# Patient Record
Sex: FEMALE | Race: WHITE | NOT HISPANIC OR LATINO | Employment: UNEMPLOYED | URBAN - METROPOLITAN AREA
[De-identification: names, ages, dates, MRNs, and addresses within clinical notes are randomized per-mention and may not be internally consistent; named-entity substitution may affect disease eponyms.]

---

## 2023-08-20 ENCOUNTER — HOSPITAL ENCOUNTER (EMERGENCY)
Facility: HOSPITAL | Age: 33
Discharge: HOME | End: 2023-08-21
Attending: EMERGENCY MEDICINE | Admitting: EMERGENCY MEDICINE

## 2023-08-20 DIAGNOSIS — F41.0 PANIC ATTACK: ICD-10-CM

## 2023-08-20 DIAGNOSIS — S92.911A CLOSED NONDISPLACED FRACTURE OF PHALANX OF TOE OF RIGHT FOOT, UNSPECIFIED TOE, INITIAL ENCOUNTER: ICD-10-CM

## 2023-08-20 DIAGNOSIS — R07.89 ATYPICAL CHEST PAIN: ICD-10-CM

## 2023-08-20 LAB
BASOPHILS # BLD AUTO: 0.1 10E3/UL (ref 0–0.2)
BASOPHILS NFR BLD AUTO: 1 %
EOSINOPHIL # BLD AUTO: 0 10E3/UL (ref 0–0.7)
EOSINOPHIL NFR BLD AUTO: 0 %
ERYTHROCYTE [DISTWIDTH] IN BLOOD BY AUTOMATED COUNT: 12.6 % (ref 10–15)
HCT VFR BLD AUTO: 32.7 % (ref 35–47)
HGB BLD-MCNC: 11.1 G/DL (ref 11.7–15.7)
IMM GRANULOCYTES # BLD: 0 10E3/UL
IMM GRANULOCYTES NFR BLD: 0 %
LYMPHOCYTES # BLD AUTO: 1.5 10E3/UL (ref 0.8–5.3)
LYMPHOCYTES NFR BLD AUTO: 21 %
MCH RBC QN AUTO: 29.8 PG (ref 26.5–33)
MCHC RBC AUTO-ENTMCNC: 33.9 G/DL (ref 31.5–36.5)
MCV RBC AUTO: 88 FL (ref 78–100)
MONOCYTES # BLD AUTO: 0.5 10E3/UL (ref 0–1.3)
MONOCYTES NFR BLD AUTO: 7 %
NEUTROPHILS # BLD AUTO: 4.8 10E3/UL (ref 1.6–8.3)
NEUTROPHILS NFR BLD AUTO: 71 %
NRBC # BLD AUTO: 0 10E3/UL
NRBC BLD AUTO-RTO: 0 /100
PLATELET # BLD AUTO: 185 10E3/UL (ref 150–450)
RBC # BLD AUTO: 3.73 10E6/UL (ref 3.8–5.2)
WBC # BLD AUTO: 6.9 10E3/UL (ref 4–11)

## 2023-08-20 PROCEDURE — 93005 ELECTROCARDIOGRAM TRACING: CPT | Performed by: EMERGENCY MEDICINE

## 2023-08-20 PROCEDURE — 80048 BASIC METABOLIC PNL TOTAL CA: CPT | Performed by: EMERGENCY MEDICINE

## 2023-08-20 PROCEDURE — 84439 ASSAY OF FREE THYROXINE: CPT | Performed by: EMERGENCY MEDICINE

## 2023-08-20 PROCEDURE — 250N000011 HC RX IP 250 OP 636: Performed by: EMERGENCY MEDICINE

## 2023-08-20 PROCEDURE — 36415 COLL VENOUS BLD VENIPUNCTURE: CPT | Performed by: EMERGENCY MEDICINE

## 2023-08-20 PROCEDURE — 28510 TREATMENT OF TOE FRACTURE: CPT | Mod: T8

## 2023-08-20 PROCEDURE — 99285 EMERGENCY DEPT VISIT HI MDM: CPT | Mod: 25

## 2023-08-20 PROCEDURE — 84443 ASSAY THYROID STIM HORMONE: CPT | Performed by: EMERGENCY MEDICINE

## 2023-08-20 PROCEDURE — 84484 ASSAY OF TROPONIN QUANT: CPT | Performed by: EMERGENCY MEDICINE

## 2023-08-20 PROCEDURE — 250N000013 HC RX MED GY IP 250 OP 250 PS 637: Performed by: EMERGENCY MEDICINE

## 2023-08-20 PROCEDURE — 85025 COMPLETE CBC W/AUTO DIFF WBC: CPT | Performed by: EMERGENCY MEDICINE

## 2023-08-20 PROCEDURE — 96374 THER/PROPH/DIAG INJ IV PUSH: CPT | Mod: 59

## 2023-08-20 RX ORDER — ASPIRIN 81 MG/1
324 TABLET, CHEWABLE ORAL ONCE
Status: COMPLETED | OUTPATIENT
Start: 2023-08-20 | End: 2023-08-20

## 2023-08-20 RX ORDER — KETOROLAC TROMETHAMINE 30 MG/ML
30 INJECTION, SOLUTION INTRAMUSCULAR; INTRAVENOUS ONCE
Status: COMPLETED | OUTPATIENT
Start: 2023-08-20 | End: 2023-08-20

## 2023-08-20 RX ADMIN — KETOROLAC TROMETHAMINE 30 MG: 30 INJECTION INTRAMUSCULAR; INTRAVENOUS at 23:44

## 2023-08-20 RX ADMIN — ASPIRIN 81 MG CHEWABLE TABLET 324 MG: 81 TABLET CHEWABLE at 23:43

## 2023-08-20 ASSESSMENT — ACTIVITIES OF DAILY LIVING (ADL): ADLS_ACUITY_SCORE: 33

## 2023-08-21 ENCOUNTER — APPOINTMENT (OUTPATIENT)
Dept: RADIOLOGY | Facility: HOSPITAL | Age: 33
End: 2023-08-21
Attending: EMERGENCY MEDICINE

## 2023-08-21 VITALS
RESPIRATION RATE: 26 BRPM | OXYGEN SATURATION: 99 % | HEART RATE: 76 BPM | DIASTOLIC BLOOD PRESSURE: 67 MMHG | TEMPERATURE: 98.1 F | SYSTOLIC BLOOD PRESSURE: 114 MMHG

## 2023-08-21 LAB
ANION GAP SERPL CALCULATED.3IONS-SCNC: 13 MMOL/L (ref 7–15)
ATRIAL RATE - MUSE: 77 BPM
BUN SERPL-MCNC: 10.7 MG/DL (ref 6–20)
CALCIUM SERPL-MCNC: 9.2 MG/DL (ref 8.6–10)
CHLORIDE SERPL-SCNC: 104 MMOL/L (ref 98–107)
CREAT SERPL-MCNC: 0.85 MG/DL (ref 0.51–0.95)
DEPRECATED HCO3 PLAS-SCNC: 23 MMOL/L (ref 22–29)
DIASTOLIC BLOOD PRESSURE - MUSE: 78 MMHG
GFR SERPL CREATININE-BSD FRML MDRD: >90 ML/MIN/1.73M2
GLUCOSE SERPL-MCNC: 121 MG/DL (ref 70–99)
INTERPRETATION ECG - MUSE: NORMAL
P AXIS - MUSE: 76 DEGREES
POTASSIUM SERPL-SCNC: 3.5 MMOL/L (ref 3.4–5.3)
PR INTERVAL - MUSE: 160 MS
QRS DURATION - MUSE: 90 MS
QT - MUSE: 412 MS
QTC - MUSE: 466 MS
R AXIS - MUSE: 69 DEGREES
SODIUM SERPL-SCNC: 140 MMOL/L (ref 136–145)
SYSTOLIC BLOOD PRESSURE - MUSE: 127 MMHG
T AXIS - MUSE: 51 DEGREES
T4 FREE SERPL-MCNC: 1.2 NG/DL (ref 0.9–1.7)
TROPONIN T SERPL HS-MCNC: <6 NG/L
TSH SERPL DL<=0.005 MIU/L-ACNC: 10.27 UIU/ML (ref 0.3–4.2)
VENTRICULAR RATE- MUSE: 77 BPM

## 2023-08-21 PROCEDURE — 73630 X-RAY EXAM OF FOOT: CPT | Mod: RT

## 2023-08-21 ASSESSMENT — ACTIVITIES OF DAILY LIVING (ADL): ADLS_ACUITY_SCORE: 35

## 2023-08-21 NOTE — DISCHARGE INSTRUCTIONS
EKG and blood work for the heart today is normal.  Your TSH is elevated, but T4 is normal.  The x-ray does show a broken toe.  You can buddy tape this as needed for comfort.

## 2023-08-21 NOTE — ED PROVIDER NOTES
EMERGENCY DEPARTMENT ENCOUNTER      NAME: Puma Briones  AGE: 33 year old female  YOB: 1990  MRN: [id number]  EVALUATION DATE & TIME: 8/20/2023 10:55 PM    PCP: No primary care provider on file.    ED PROVIDER: Maryam Kyle MD    Chief Complaint   Patient presents with    Chest Pain    Anxiety         FINAL IMPRESSION:  1. Panic attack    2. Atypical chest pain    3. Closed nondisplaced fracture of phalanx of toe of right foot, unspecified toe, initial encounter          ED COURSE & MEDICAL DECISION MAKING:    Pertinent Labs & Imaging studies reviewed. (See chart for details)  33 year old female with history of hypothyroidism who presents to the Emergency Department for evaluation of right-sided fourth toe pain.  On examination this is bruised, tender concerning for fracture versus sprain versus contusion.  X-ray obtained, fracture of the fourth distal phalanx.  This was alexander taped.  Patient counseled regarding same.      The main reason for her ED visit however is the episode of hyperventilation, chest discomfort, shortness of breath, lightheadedness, palpitations and finger paresthesias that followed a ice bath submersion of her foot.  She has had similar episodes in the past, but typically they are self-limited.  This seems most consistent with anxiety/panic attack.  Patient is PERC negative and does not warrant work-up for pulmonary embolism.  Certainly arrhythmia is on the differential, but I think less likely given the constellation of symptoms.  My concern for ACS is low.  She denies any significant caffeine, stimulant abuse.    Patient placed on monitor, IV established and blood obtained.  Twelve-lead EKG shows sinus rhythm.  Patient was given a dose of aspirin, Toradol for pain.  CBC, BMP, troponin notable for anemia with hemoglobin of 11.1.  No previous with which to compare.  TSH is elevated at 10.27 but free T4 is normal at 1.2 consistent with her history.  Patient was reassured,  counseled.  Safe for discharge to home.  Given PCP referral to establish care.      ED Course as of 08/21/23 0303   Sun Aug 20, 2023   2311 I met with the patient to gather history and perform my exam. ED course and treatment discussed.    2348 Hemoglobin(!): 11.1  No prev   Mon Aug 21, 2023   0032 TSH(!): 10.27  Hx of hypothyroid per pt   0032 Troponin T, High Sensitivity: <6  Doesn't warrant repeat    0045 Foot  XR, G/E 3 views, right  X-ray of the foot independently interpreted by myself with visualized fracture of the 4th distal phalanx   0130 I updated the patient with lab and imaging results and discussed the plan for discharge.          Medical Decision Making    History:  Supplemental history from: Documented in chart, if applicable and Friend  External Record(s) reviewed: Documented in chart, if applicable.    Work Up:  Chart documentation includes differential considered and any EKGs or imaging independently interpreted by provider, see MDM  In additional to work up documented, I considered the following work up: see MDM    External consultation:  Discussion of management with another provider: na    Complicating factors:  Care impacted by chronic illness: Other: hypothryroidism   Care affected by social determinants of health: Access to Medical Care no PCP    Disposition considerations: Discharge. No recommendations on prescription strength medication(s). See documentation for any additional details.        At the conclusion of the encounter I discussed the results of all of the tests and the disposition. The questions were answered. The patient or family acknowledged understanding and was agreeable with the care plan.    MEDICATIONS GIVEN IN THE EMERGENCY:  Medications   aspirin (ASA) chewable tablet 324 mg (324 mg Oral $Given 8/20/23 2343)   ketorolac (TORADOL) injection 30 mg (30 mg Intravenous $Given 8/20/23 2344)       NEW PRESCRIPTIONS STARTED AT TODAY'S ER VISIT  There are no discharge medications  "for this patient.         =================================================================    HPI    Patient information was obtained from: Patient     Use of Intrepreter: N/A      Puma Briones is a 33 year old female with pertinent history of hypothyroidism (self reported) who presents with multiple complaints    The patient reports yesterday she \"bumped\" her right 4th toe on a chair and it is now bruised and painful. Tonight, the patient put her right foot into a bucket of cold water.  Thereafter \"suddenly\" felt numbness in her hands, dizziness, shortness of breath, chest pain, \"racing\" heart, and lightheadedness. The patient denies loss of consciousness, but notes it felt like she was going to faint. The patient took a warm shower to calm down, but states she \"couldn't stop shaking\" and \"couldn't stop breathing hard\". The patient denies increased caffeine use, nausea, vomiting, diarrhea, leg swelling, recent travel, hx of dvt/pe.    The patient notes for the past year she has had similar events will usually resolve on their own, but tonight it didn't so the patient came to the ED.    PAST MEDICAL HISTORY:  No past medical history on file.    PAST SURGICAL HISTORY:  No past surgical history on file.    CURRENT MEDICATIONS:    None       ALLERGIES:  Not on File    FAMILY HISTORY:  No family history on file.    SOCIAL HISTORY:        VITALS:  Patient Vitals for the past 24 hrs:   BP Temp Temp src Pulse Resp SpO2   08/21/23 0119 114/67 -- -- 76 26 99 %   08/21/23 0059 -- -- -- 76 29 99 %   08/21/23 0031 -- -- -- 78 15 100 %   08/20/23 2332 -- -- -- 80 14 99 %   08/20/23 2321 132/81 -- -- 76 -- 100 %   08/20/23 2315 124/62 -- -- 86 28 98 %   08/20/23 2259 127/78 -- -- 84 -- 99 %   08/20/23 2250 (!) 140/75 98.1  F (36.7  C) Oral 90 16 100 %       PHYSICAL EXAM    General Appearance: Anxious-appearing, well-nourished, no acute distress   Head:  Normocephalic  Chest:  No tenderness or deformity, no crepitus  Cardio: "  Regular rate and rhythm, no murmur/gallop/rub, 2+ pulses symmetric in all extremities  Pulm:  No respiratory distress, clear to auscultation bilaterally  Back:  No CVA tenderness, normal ROM  Abdomen:  Soft, non-tender  Extremities:  Extremities normal, no cyanosis or edema, full ROM and motor tone intact, bilateral pulses intact upper and lower. Right 4th toe is bruised and swollen and TTP  Skin:  Skin warm, dry, no rashes  Neuro:  Alert and oriented ×3, moving all extremities, no gross sensory defects     RADIOLOGY/LABS:  Reviewed all pertinent imaging. Please see official radiology report. All pertinent labs reviewed and interpreted.    Results for orders placed or performed during the hospital encounter of 08/20/23   Foot  XR, G/E 3 views, right    Impression    IMPRESSION: The right 2 phalanges in the fourth and fifth digits, normal variant anatomy. Subtle lucency involving the proximal metaphysis of the distal phalanx raises the possibility of an undisplaced fracture. Please correlate clinically for focal   tenderness at this level. No extension to the articular surface. Bipartite medial sesamoid at the head of the first metatarsal.   Basic metabolic panel   Result Value Ref Range    Sodium 140 136 - 145 mmol/L    Potassium 3.5 3.4 - 5.3 mmol/L    Chloride 104 98 - 107 mmol/L    Carbon Dioxide (CO2) 23 22 - 29 mmol/L    Anion Gap 13 7 - 15 mmol/L    Urea Nitrogen 10.7 6.0 - 20.0 mg/dL    Creatinine 0.85 0.51 - 0.95 mg/dL    Calcium 9.2 8.6 - 10.0 mg/dL    Glucose 121 (H) 70 - 99 mg/dL    GFR Estimate >90 >60 mL/min/1.73m2   Result Value Ref Range    Troponin T, High Sensitivity <6 <=14 ng/L   TSH with free T4 reflex   Result Value Ref Range    TSH 10.27 (H) 0.30 - 4.20 uIU/mL   CBC with platelets and differential   Result Value Ref Range    WBC Count 6.9 4.0 - 11.0 10e3/uL    RBC Count 3.73 (L) 3.80 - 5.20 10e6/uL    Hemoglobin 11.1 (L) 11.7 - 15.7 g/dL    Hematocrit 32.7 (L) 35.0 - 47.0 %    MCV 88 78 - 100  fL    MCH 29.8 26.5 - 33.0 pg    MCHC 33.9 31.5 - 36.5 g/dL    RDW 12.6 10.0 - 15.0 %    Platelet Count 185 150 - 450 10e3/uL    % Neutrophils 71 %    % Lymphocytes 21 %    % Monocytes 7 %    % Eosinophils 0 %    % Basophils 1 %    % Immature Granulocytes 0 %    NRBCs per 100 WBC 0 <1 /100    Absolute Neutrophils 4.8 1.6 - 8.3 10e3/uL    Absolute Lymphocytes 1.5 0.8 - 5.3 10e3/uL    Absolute Monocytes 0.5 0.0 - 1.3 10e3/uL    Absolute Eosinophils 0.0 0.0 - 0.7 10e3/uL    Absolute Basophils 0.1 0.0 - 0.2 10e3/uL    Absolute Immature Granulocytes 0.0 <=0.4 10e3/uL    Absolute NRBCs 0.0 10e3/uL   Result Value Ref Range    Free T4 1.20 0.90 - 1.70 ng/dL       EKG:  Performed at: 8/20/23 at 11:12 PM    Impression: Normal EKG    Rate: 77  Rhythm: Sinus  Axis: 69  DC Interval: 160  QRS Interval: 90  QTc Interval: 466  ST Changes: None   Comparison: No previous EKGs available   I have independently reviewed and interpreted the EKG(s) documented above.      The creation of this record is based on the scribe s observations of the work being performed by Maryam Kyle MD and the provider s statements to them. It was created on her behalf by Maliha Perez, a trained medical scribe. This document has been checked and approved by the attending provider.    Maryam Kyle MD  Emergency Medicine  [facility] EMERGENCY DEPARTMENT  [address] [place] [id number] [phone]  Dept: [phone]     Maryam Kyle MD  08/21/23 8196

## 2023-08-21 NOTE — ED TRIAGE NOTES
The patient reports that about 1 hour ago she had a sudden onset of chest pain and sob while she was icing her foot. She reports that she felt like she could not catch her breath and her fingers started tingling. She denies a history of anxiety. She reports experiencing these symptoms randomly for the past year but never this bad. She has not been seen for it.      Triage Assessment       Row Name 08/20/23 5006       Triage Assessment (Adult)    Airway WDL WDL       Cognitive/Neuro/Behavioral WDL    Cognitive/Neuro/Behavioral WDL WDL

## 2023-10-22 ENCOUNTER — HEALTH MAINTENANCE LETTER (OUTPATIENT)
Age: 33
End: 2023-10-22

## 2024-12-15 ENCOUNTER — HEALTH MAINTENANCE LETTER (OUTPATIENT)
Age: 34
End: 2024-12-15